# Patient Record
Sex: MALE | Race: ASIAN | NOT HISPANIC OR LATINO | URBAN - METROPOLITAN AREA
[De-identification: names, ages, dates, MRNs, and addresses within clinical notes are randomized per-mention and may not be internally consistent; named-entity substitution may affect disease eponyms.]

---

## 2017-04-02 ENCOUNTER — EMERGENCY (EMERGENCY)
Facility: HOSPITAL | Age: 67
LOS: 1 days | Discharge: HOME | End: 2017-04-02
Attending: EMERGENCY MEDICINE | Admitting: EMERGENCY MEDICINE
Payer: MEDICARE

## 2017-04-02 VITALS
OXYGEN SATURATION: 97 % | HEART RATE: 60 BPM | SYSTOLIC BLOOD PRESSURE: 155 MMHG | DIASTOLIC BLOOD PRESSURE: 90 MMHG | TEMPERATURE: 98 F | HEIGHT: 67 IN | RESPIRATION RATE: 14 BRPM | WEIGHT: 160.06 LBS

## 2017-04-02 VITALS
HEART RATE: 67 BPM | RESPIRATION RATE: 14 BRPM | DIASTOLIC BLOOD PRESSURE: 64 MMHG | TEMPERATURE: 98 F | SYSTOLIC BLOOD PRESSURE: 132 MMHG | OXYGEN SATURATION: 97 %

## 2017-04-02 DIAGNOSIS — J18.9 PNEUMONIA, UNSPECIFIED ORGANISM: ICD-10-CM

## 2017-04-02 DIAGNOSIS — Z88.0 ALLERGY STATUS TO PENICILLIN: ICD-10-CM

## 2017-04-02 DIAGNOSIS — I10 ESSENTIAL (PRIMARY) HYPERTENSION: ICD-10-CM

## 2017-04-02 DIAGNOSIS — R07.89 OTHER CHEST PAIN: ICD-10-CM

## 2017-04-02 LAB
ALBUMIN SERPL ELPH-MCNC: 3.9 G/DL — SIGNIFICANT CHANGE UP (ref 3.3–5)
ALP SERPL-CCNC: 86 U/L — SIGNIFICANT CHANGE UP (ref 40–120)
ALT FLD-CCNC: 34 U/L — SIGNIFICANT CHANGE UP (ref 12–78)
ANION GAP SERPL CALC-SCNC: 9 MMOL/L — SIGNIFICANT CHANGE UP (ref 5–17)
APTT BLD: 38.6 SEC — HIGH (ref 27.5–37.4)
AST SERPL-CCNC: 16 U/L — SIGNIFICANT CHANGE UP (ref 15–37)
BASOPHILS # BLD AUTO: 0 K/UL — SIGNIFICANT CHANGE UP (ref 0–0.2)
BASOPHILS NFR BLD AUTO: 0.5 % — SIGNIFICANT CHANGE UP (ref 0–2)
BILIRUB SERPL-MCNC: 0.3 MG/DL — SIGNIFICANT CHANGE UP (ref 0.2–1.2)
BUN SERPL-MCNC: 19 MG/DL — SIGNIFICANT CHANGE UP (ref 7–23)
CALCIUM SERPL-MCNC: 8.7 MG/DL — SIGNIFICANT CHANGE UP (ref 8.5–10.1)
CHLORIDE SERPL-SCNC: 106 MMOL/L — SIGNIFICANT CHANGE UP (ref 96–108)
CK MB BLD-MCNC: 0.7 % — SIGNIFICANT CHANGE UP (ref 0–3.5)
CK MB BLD-MCNC: <0.8 % — SIGNIFICANT CHANGE UP (ref 0–3.5)
CK MB CFR SERPL CALC: 0.5 NG/ML — SIGNIFICANT CHANGE UP (ref 0–3.6)
CK MB CFR SERPL CALC: <0.5 NG/ML — SIGNIFICANT CHANGE UP (ref 0–3.6)
CK SERPL-CCNC: 65 U/L — SIGNIFICANT CHANGE UP (ref 26–308)
CK SERPL-CCNC: 70 U/L — SIGNIFICANT CHANGE UP (ref 26–308)
CO2 SERPL-SCNC: 26 MMOL/L — SIGNIFICANT CHANGE UP (ref 22–31)
CREAT SERPL-MCNC: 0.78 MG/DL — SIGNIFICANT CHANGE UP (ref 0.5–1.3)
EOSINOPHIL # BLD AUTO: 0.1 K/UL — SIGNIFICANT CHANGE UP (ref 0–0.5)
EOSINOPHIL NFR BLD AUTO: 1 % — SIGNIFICANT CHANGE UP (ref 0–6)
GLUCOSE SERPL-MCNC: 81 MG/DL — SIGNIFICANT CHANGE UP (ref 70–99)
HCT VFR BLD CALC: 46.5 % — SIGNIFICANT CHANGE UP (ref 39–50)
HGB BLD-MCNC: 15.8 G/DL — SIGNIFICANT CHANGE UP (ref 13–17)
INR BLD: 0.99 RATIO — SIGNIFICANT CHANGE UP (ref 0.88–1.16)
LYMPHOCYTES # BLD AUTO: 2.1 K/UL — SIGNIFICANT CHANGE UP (ref 1–3.3)
LYMPHOCYTES # BLD AUTO: 33.1 % — SIGNIFICANT CHANGE UP (ref 13–44)
MCHC RBC-ENTMCNC: 29.8 PG — SIGNIFICANT CHANGE UP (ref 27–34)
MCHC RBC-ENTMCNC: 34 GM/DL — SIGNIFICANT CHANGE UP (ref 32–36)
MCV RBC AUTO: 87.7 FL — SIGNIFICANT CHANGE UP (ref 80–100)
MONOCYTES # BLD AUTO: 0.4 K/UL — SIGNIFICANT CHANGE UP (ref 0–0.9)
MONOCYTES NFR BLD AUTO: 6.9 % — SIGNIFICANT CHANGE UP (ref 1–9)
NEUTROPHILS # BLD AUTO: 3.7 K/UL — SIGNIFICANT CHANGE UP (ref 1.8–7.4)
NEUTROPHILS NFR BLD AUTO: 58.5 % — SIGNIFICANT CHANGE UP (ref 43–77)
PLATELET # BLD AUTO: 315 K/UL — SIGNIFICANT CHANGE UP (ref 150–400)
POTASSIUM SERPL-MCNC: 4 MMOL/L — SIGNIFICANT CHANGE UP (ref 3.5–5.3)
POTASSIUM SERPL-SCNC: 4 MMOL/L — SIGNIFICANT CHANGE UP (ref 3.5–5.3)
PROT SERPL-MCNC: 8.3 G/DL — SIGNIFICANT CHANGE UP (ref 6–8.3)
PROTHROM AB SERPL-ACNC: 10.8 SEC — SIGNIFICANT CHANGE UP (ref 9.8–12.7)
RBC # BLD: 5.3 M/UL — SIGNIFICANT CHANGE UP (ref 4.2–5.8)
RBC # FLD: 12.5 % — SIGNIFICANT CHANGE UP (ref 10.3–14.5)
SODIUM SERPL-SCNC: 141 MMOL/L — SIGNIFICANT CHANGE UP (ref 135–145)
TROPONIN I SERPL-MCNC: <.015 NG/ML — SIGNIFICANT CHANGE UP (ref 0.01–0.04)
TROPONIN I SERPL-MCNC: <.015 NG/ML — SIGNIFICANT CHANGE UP (ref 0.01–0.04)
WBC # BLD: 6.4 K/UL — SIGNIFICANT CHANGE UP (ref 3.8–10.5)
WBC # FLD AUTO: 6.4 K/UL — SIGNIFICANT CHANGE UP (ref 3.8–10.5)

## 2017-04-02 PROCEDURE — 36000 PLACE NEEDLE IN VEIN: CPT

## 2017-04-02 PROCEDURE — 71010: CPT | Mod: 26

## 2017-04-02 PROCEDURE — 85730 THROMBOPLASTIN TIME PARTIAL: CPT

## 2017-04-02 PROCEDURE — 93005 ELECTROCARDIOGRAM TRACING: CPT

## 2017-04-02 PROCEDURE — 82550 ASSAY OF CK (CPK): CPT

## 2017-04-02 PROCEDURE — 84484 ASSAY OF TROPONIN QUANT: CPT

## 2017-04-02 PROCEDURE — 82553 CREATINE MB FRACTION: CPT

## 2017-04-02 PROCEDURE — 85027 COMPLETE CBC AUTOMATED: CPT

## 2017-04-02 PROCEDURE — 99285 EMERGENCY DEPT VISIT HI MDM: CPT

## 2017-04-02 PROCEDURE — 85610 PROTHROMBIN TIME: CPT

## 2017-04-02 PROCEDURE — 80053 COMPREHEN METABOLIC PANEL: CPT

## 2017-04-02 PROCEDURE — 71045 X-RAY EXAM CHEST 1 VIEW: CPT

## 2017-04-02 PROCEDURE — 99284 EMERGENCY DEPT VISIT MOD MDM: CPT

## 2017-04-02 RX ORDER — SODIUM CHLORIDE 9 MG/ML
3 INJECTION INTRAMUSCULAR; INTRAVENOUS; SUBCUTANEOUS ONCE
Qty: 0 | Refills: 0 | Status: COMPLETED | OUTPATIENT
Start: 2017-04-02 | End: 2017-04-02

## 2017-04-02 RX ORDER — CIPROFLOXACIN LACTATE 400MG/40ML
1 VIAL (ML) INTRAVENOUS
Qty: 7 | Refills: 0
Start: 2017-04-02 | End: 2017-04-09

## 2017-04-02 RX ADMIN — SODIUM CHLORIDE 3 MILLILITER(S): 9 INJECTION INTRAMUSCULAR; INTRAVENOUS; SUBCUTANEOUS at 19:09

## 2017-04-02 NOTE — ED ADULT NURSE NOTE - OBJECTIVE STATEMENT
pt to er by ambulance states he had chest pain earlier today upon arrival is awake and alert speech clear lungs clear resp even unlabored pain free at present on cardiac monitor asa and nitro 20 angio right ac pta

## 2017-04-02 NOTE — CONSULT NOTE ADULT - ASSESSMENT
The patient is a 66-year-old man with a history of hypertension and a history of palpitations, presenting with an episode of weakness, palpitations and atypical chest discomfort.    The patient is presenting with symptoms that he has had in some fashion for at least 5 or 6 years in the case of the chest discomfort, and for decades in the case of the palpitations.  It certainly is possible that he has some form of arrhythmia. I would not be surprised if he has an actual diagnosis of SVT, that is being treated fairly successfully with beta blockers. He had a stress test 5 or 6 years ago for the chest discomfort.    At this time, I would recommend repetition of his cardiac enzymes. If those are negative, this would suggest that he is in a sufficiently low risk category so as to be able to complete outpatient evaluation, as opposed to an inpatient evaluation. I would suggest outpatient stress testing. He might also benefit from an outpatient event monitor. If his enzymes are abnormal, he can be considered for cardiac catheterization.    Thank you for this referral and we will continue to follow him while he remains hospitalized

## 2017-04-02 NOTE — ED PROVIDER NOTE - OBJECTIVE STATEMENT
pt bib ems for left sided chest pain while shopping. pt given sl NTG and asa with relief of pain. no fevers, chills, ha, sob, cough, abd pain, edema, calf pain, travel.  pmd [place] [facility] [place]

## 2017-04-02 NOTE — ED PROVIDER NOTE - CARE PLAN
Principal Discharge DX:	Chest pain, unspecified type Principal Discharge DX:	Chest pain, unspecified type  Secondary Diagnosis:	Pneumonia

## 2017-04-02 NOTE — CONSULT NOTE ADULT - SUBJECTIVE AND OBJECTIVE BOX
[facility]         Jessica Ritchie, Brianda De La Garza, [place]        [phone] (office)    CHIEF COMPLAINT: Patient is a 66y old  Male who presents with a chief complaint of cp and palpitations    HPI: The patient is a 66-year-old man with a history of hypertension. He has a history of a "irregular heartbeat", for which he has been on  metoprolol for the last 5 years. He has a history of recurrent episodes of syncope associated with a sensation of a fast heartbeat, which has been occurring since he was a child. He has history of atypical chest discomfort which goes back at least 5 or 6 years, for which he has had a stress test in the past.    He presents to the hospital today because of palpitations and chest discomfort. He reports that over the past many decades, he will feel a sudden sense of "loss of energy", which occurred suddenly. This would generally be followed by a sense of a "jumping" heartbeat, and a feeling of presyncope or even syncope.  Today, he walked up a flight of stairs, and after that, felt the sudden onset of weakness, and a sense that he "might collapse ". He reported that his vision was not very sharp, and he was mildly sweaty. He's been given nitroglycerin for unclear reasons by a doctor in [place], and he decided to open it and put a nitroglycerin tablet under his tongue. He then developed a headache. Sometime after this, he developed 5 or 10 minutes of a "mild" chest discomfort which is left-sided underneath his left breast, which he has had intermittently over the last 5 or 6 years. This resolved spontaneously. He became concerned, and therefore came to the emergency department for further evaluation.    [facility] he's been found to have a normal EKG, and a single set of cardiac enzymes was negative. Cardiology consultation is being obtained to make further recommendations regarding his ongoing evaluation and management.    He presently feels well.      PAST MEDICAL & SURGICAL HISTORY:  HTN (hypertension)  Cholecystectomy    SOCIAL HISTORY: No active tobacco, alcohol or illicit drug use    FAMILY HISTORY: His father had some sort of lung issues.    Outpatient medications include metoprolol, 50 mg twice daily  MEDICATIONS  (STANDING):  sodium chloride 0.9% lock flush 3milliLiter(s) IV Push once    MEDICATIONS  (PRN):      Allergies    penicillin (Unknown)    Intolerances        REVIEW OF SYSTEMS: Is negative for eye, ENT, GI, , allergic, dermatologic, musculoskeletal and neurologic, except as described above.    VITAL SIGNS:   Vital Signs Last 24 Hrs  T(C): 36.4, Max: 36.4 (04-02 @ 12:28)  T(F): 97.6, Max: 97.6 (04-02 @ 12:28)  HR: 60 (60 - 60)  BP: 155/90 (155/90 - 155/90)  BP(mean): --  RR: 14 (14 - 14)  SpO2: 97% (97% - 97%)    I&O's Summary      PHYSICAL EXAM:    Constitutional: NAD, awake and alert, well-developed  Eyes:  EOMI,  Pupils round, No oral cyanosis, conjunctivae clear, anicteric.  Pulmonary: Non-labored, breath sounds are clear bilaterally, No wheezing, rales or rhonchi  Cardiovascular: PMI not palpable non-displaced, regular S1 and S2, no murmurs, rubs, gallops or clicks  Gastrointestinal: Bowel Sounds present, soft, nontender.   Lymph: No peripheral edema. No cervical lymphadenopathy.  Neurological: Alert, strength and sensitivity are grossly intact  Skin: No obvious lesions/rashes.   Psych:  Mood & affect appropriate .    LABS: All Labs Reviewed:                        15.8   6.4   )-----------( 315      ( 02 Apr 2017 13:23 )             46.5     02 Apr 2017 13:23    141    |  106    |  19     ----------------------------<  81     4.0     |  26     |  0.78     Ca    8.7        02 Apr 2017 13:23    TPro  8.3    /  Alb  3.9    /  TBili  0.3    /  DBili  x      /  AST  16     /  ALT  34     /  AlkPhos  86     02 Apr 2017 13:23    PT/INR - ( 02 Apr 2017 13:23 )   PT: 10.8 sec;   INR: 0.99 ratio         PTT - ( 02 Apr 2017 13:23 )  PTT:38.6 sec  CARDIAC MARKERS ( 02 Apr 2017 13:23 )  <.015 ng/mL / x     / 65 U/L / x     / <0.5 ng/mL      Blood Culture:         RADIOLOGY:    EKG:Sinus rhythm, normal

## 2017-04-03 RX ORDER — CIPROFLOXACIN LACTATE 400MG/40ML
1 VIAL (ML) INTRAVENOUS
Qty: 7 | Refills: 0
Start: 2017-04-03 | End: 2017-04-10

## 2018-01-09 ENCOUNTER — EMERGENCY (EMERGENCY)
Facility: HOSPITAL | Age: 68
LOS: 1 days | Discharge: HOME | End: 2018-01-09
Attending: EMERGENCY MEDICINE | Admitting: EMERGENCY MEDICINE
Payer: MEDICARE

## 2018-01-09 VITALS
HEART RATE: 81 BPM | TEMPERATURE: 98 F | DIASTOLIC BLOOD PRESSURE: 83 MMHG | SYSTOLIC BLOOD PRESSURE: 139 MMHG | RESPIRATION RATE: 19 BRPM | OXYGEN SATURATION: 96 %

## 2018-01-09 VITALS
RESPIRATION RATE: 18 BRPM | DIASTOLIC BLOOD PRESSURE: 97 MMHG | SYSTOLIC BLOOD PRESSURE: 155 MMHG | OXYGEN SATURATION: 95 % | TEMPERATURE: 98 F | HEART RATE: 73 BPM

## 2018-01-09 LAB
ALBUMIN SERPL ELPH-MCNC: 4.1 G/DL — SIGNIFICANT CHANGE UP (ref 3.3–5)
ALP SERPL-CCNC: 71 U/L — SIGNIFICANT CHANGE UP (ref 40–120)
ALT FLD-CCNC: 33 U/L — SIGNIFICANT CHANGE UP (ref 4–41)
AST SERPL-CCNC: 25 U/L — SIGNIFICANT CHANGE UP (ref 4–40)
BASOPHILS # BLD AUTO: 0.02 K/UL — SIGNIFICANT CHANGE UP (ref 0–0.2)
BASOPHILS NFR BLD AUTO: 0.3 % — SIGNIFICANT CHANGE UP (ref 0–2)
BILIRUB SERPL-MCNC: 0.3 MG/DL — SIGNIFICANT CHANGE UP (ref 0.2–1.2)
BUN SERPL-MCNC: 12 MG/DL — SIGNIFICANT CHANGE UP (ref 7–23)
CALCIUM SERPL-MCNC: 8.8 MG/DL — SIGNIFICANT CHANGE UP (ref 8.4–10.5)
CHLORIDE SERPL-SCNC: 104 MMOL/L — SIGNIFICANT CHANGE UP (ref 98–107)
CK MB BLD-MCNC: 1 NG/ML — SIGNIFICANT CHANGE UP (ref 1–6.6)
CK SERPL-CCNC: 58 U/L — SIGNIFICANT CHANGE UP (ref 30–200)
CO2 SERPL-SCNC: 21 MMOL/L — LOW (ref 22–31)
CREAT SERPL-MCNC: 0.71 MG/DL — SIGNIFICANT CHANGE UP (ref 0.5–1.3)
D DIMER BLD IA.RAPID-MCNC: < 150 NG/ML — SIGNIFICANT CHANGE UP
EOSINOPHIL # BLD AUTO: 0.05 K/UL — SIGNIFICANT CHANGE UP (ref 0–0.5)
EOSINOPHIL NFR BLD AUTO: 0.8 % — SIGNIFICANT CHANGE UP (ref 0–6)
GLUCOSE SERPL-MCNC: 101 MG/DL — HIGH (ref 70–99)
HCT VFR BLD CALC: 43.3 % — SIGNIFICANT CHANGE UP (ref 39–50)
HGB BLD-MCNC: 14.8 G/DL — SIGNIFICANT CHANGE UP (ref 13–17)
IMM GRANULOCYTES # BLD AUTO: 0.03 # — SIGNIFICANT CHANGE UP
IMM GRANULOCYTES NFR BLD AUTO: 0.5 % — SIGNIFICANT CHANGE UP (ref 0–1.5)
LYMPHOCYTES # BLD AUTO: 1.63 K/UL — SIGNIFICANT CHANGE UP (ref 1–3.3)
LYMPHOCYTES # BLD AUTO: 24.5 % — SIGNIFICANT CHANGE UP (ref 13–44)
MAGNESIUM SERPL-MCNC: 2.1 MG/DL — SIGNIFICANT CHANGE UP (ref 1.6–2.6)
MCHC RBC-ENTMCNC: 30.4 PG — SIGNIFICANT CHANGE UP (ref 27–34)
MCHC RBC-ENTMCNC: 34.2 % — SIGNIFICANT CHANGE UP (ref 32–36)
MCV RBC AUTO: 88.9 FL — SIGNIFICANT CHANGE UP (ref 80–100)
MONOCYTES # BLD AUTO: 0.34 K/UL — SIGNIFICANT CHANGE UP (ref 0–0.9)
MONOCYTES NFR BLD AUTO: 5.1 % — SIGNIFICANT CHANGE UP (ref 2–14)
NEUTROPHILS # BLD AUTO: 4.58 K/UL — SIGNIFICANT CHANGE UP (ref 1.8–7.4)
NEUTROPHILS NFR BLD AUTO: 68.8 % — SIGNIFICANT CHANGE UP (ref 43–77)
NRBC # FLD: 0 — SIGNIFICANT CHANGE UP
PHOSPHATE SERPL-MCNC: 2.6 MG/DL — SIGNIFICANT CHANGE UP (ref 2.5–4.5)
PLATELET # BLD AUTO: 278 K/UL — SIGNIFICANT CHANGE UP (ref 150–400)
PMV BLD: 9.6 FL — SIGNIFICANT CHANGE UP (ref 7–13)
POTASSIUM SERPL-MCNC: 4.3 MMOL/L — SIGNIFICANT CHANGE UP (ref 3.5–5.3)
POTASSIUM SERPL-SCNC: 4.3 MMOL/L — SIGNIFICANT CHANGE UP (ref 3.5–5.3)
PROT SERPL-MCNC: 7.5 G/DL — SIGNIFICANT CHANGE UP (ref 6–8.3)
RBC # BLD: 4.87 M/UL — SIGNIFICANT CHANGE UP (ref 4.2–5.8)
RBC # FLD: 13.2 % — SIGNIFICANT CHANGE UP (ref 10.3–14.5)
SODIUM SERPL-SCNC: 139 MMOL/L — SIGNIFICANT CHANGE UP (ref 135–145)
TROPONIN T SERPL-MCNC: < 0.06 NG/ML — SIGNIFICANT CHANGE UP (ref 0–0.06)
TROPONIN T SERPL-MCNC: < 0.06 NG/ML — SIGNIFICANT CHANGE UP (ref 0–0.06)
WBC # BLD: 6.65 K/UL — SIGNIFICANT CHANGE UP (ref 3.8–10.5)
WBC # FLD AUTO: 6.65 K/UL — SIGNIFICANT CHANGE UP (ref 3.8–10.5)

## 2018-01-09 PROCEDURE — 99285 EMERGENCY DEPT VISIT HI MDM: CPT | Mod: 25

## 2018-01-09 PROCEDURE — 71046 X-RAY EXAM CHEST 2 VIEWS: CPT | Mod: 26

## 2018-01-09 NOTE — ED ADULT TRIAGE NOTE - CHIEF COMPLAINT QUOTE
pt. c/o palpitations w/ diaphoresis that started at approximately 0200am, took a dose of "chinese medication", states it is similar to nitro, palpations have since subsided, not associated w/ SOB.  Pt. reports having a normal stress test about 1 week ago.  PMHx HTN.

## 2018-01-09 NOTE — ED PROVIDER NOTE - ATTENDING CONTRIBUTION TO CARE
68 yo male with a history of HTN presenting to the ED with an episode of palpitations and diaphoresis associated with mild left sided chest pressure that started around 0200. Pain feels like a few books sitting on his chest.  Has gotten better since arriving in the ED.  There is no radiation of the pain.  Has had a number of episodes similar to this over the last few months.  His cardiologist has been working this up since it started.  Last week he had an ECHO and a stress that were both normal.  Also had a sleep study two nights ago to see if it was related to sleep apnea.  No SOB.    Gen: Well appearing in NAD  Head: NC/AT  Neck: trachea midline  Resp:  No distress CTAB  CV: RRR   Abd: soft NT ND  Ext: no deformities no edema  Neuro:  A&O appears non focal  Skin:  Warm and dry as visualized    Pt with palps and chest pressure.  Has had episodes before.  Just had provocative testing and ECHO which are negative.  Due to age and HTN will get two sets of CE and EKG.  There is are no concerning findings on the EKG.  I was able to discuss the patient's care with Dr Amaya of cardiology.  He is very aware of the patient and agrees this is a typical episode for the patient.  He requested a d-dimer be sent.  If negative he would provide OP follow up along with referral to an EP specialist.  Will get the second trop as well.  Shared decision making with the patient about the plan and follow up necessity.

## 2018-01-09 NOTE — ED PROVIDER NOTE - CARE PLAN
Principal Discharge DX:	Palpitations  Instructions for follow-up, activity and diet:	Please follow up with your cardiologist in regards to your symptoms in the next week.  Drink at least 2 Liters or 64 Ounces of water each day.  Return for any persistent, worsening symptoms, or ANY concerns at all.

## 2018-01-09 NOTE — ED PROVIDER NOTE - PLAN OF CARE
Please follow up with your cardiologist in regards to your symptoms in the next week.  Drink at least 2 Liters or 64 Ounces of water each day.  Return for any persistent, worsening symptoms, or ANY concerns at all.

## 2018-01-09 NOTE — ED ADULT NURSE NOTE - OBJECTIVE STATEMENT
pt rc'd to room 16 A&Ox3 c/o palpitations with sweating that woke him from sleep at 2am lasting 15 minutes. Pt took chinese medicine for palpitations which caused the symptoms to resolve. Pt denies CP/dizziness/sweating/SOB/palpitations at present, appears NSR on monitor. Pt hx of HTN & cardiac stress test last week, EKG complete, IV 20G placed R AC vein, labs sent.

## 2018-01-09 NOTE — ED PROVIDER NOTE - OBJECTIVE STATEMENT
68yo M w/ pmhx of HTN c/o palpitations w/ diaphoresis that started around 2am. Denies any nausea, vomiting, diarrhea, constipation. Pt took a "chinese medication", which is like the equivalent of nitro, which brought some relief. Currently, pt reports mild left side chest tightness, which feels like "two books sitting on my chest". Pt has a stress test about 1 week ago, which was unremarkable. Denies any other symptoms.

## 2019-04-19 ENCOUNTER — EMERGENCY (EMERGENCY)
Facility: HOSPITAL | Age: 69
LOS: 1 days | Discharge: HOME | End: 2019-04-19
Attending: EMERGENCY MEDICINE
Payer: MEDICARE

## 2019-04-19 VITALS
SYSTOLIC BLOOD PRESSURE: 133 MMHG | RESPIRATION RATE: 20 BRPM | TEMPERATURE: 98 F | DIASTOLIC BLOOD PRESSURE: 76 MMHG | OXYGEN SATURATION: 95 % | HEART RATE: 81 BPM

## 2019-04-19 VITALS
OXYGEN SATURATION: 96 % | RESPIRATION RATE: 18 BRPM | TEMPERATURE: 98 F | HEART RATE: 81 BPM | DIASTOLIC BLOOD PRESSURE: 90 MMHG | SYSTOLIC BLOOD PRESSURE: 152 MMHG

## 2019-04-19 PROBLEM — I10 ESSENTIAL (PRIMARY) HYPERTENSION: Chronic | Status: ACTIVE | Noted: 2017-04-02

## 2019-04-19 LAB
ALBUMIN SERPL ELPH-MCNC: 4.2 G/DL — SIGNIFICANT CHANGE UP (ref 3.3–5)
ALP SERPL-CCNC: 78 U/L — SIGNIFICANT CHANGE UP (ref 40–120)
ALT FLD-CCNC: 16 U/L — SIGNIFICANT CHANGE UP (ref 10–45)
ANION GAP SERPL CALC-SCNC: 12 MMOL/L — SIGNIFICANT CHANGE UP (ref 5–17)
APTT BLD: 37.4 SEC — HIGH (ref 27.5–36.3)
AST SERPL-CCNC: 16 U/L — SIGNIFICANT CHANGE UP (ref 10–40)
BILIRUB SERPL-MCNC: 0.2 MG/DL — SIGNIFICANT CHANGE UP (ref 0.2–1.2)
BUN SERPL-MCNC: 12 MG/DL — SIGNIFICANT CHANGE UP (ref 7–23)
CALCIUM SERPL-MCNC: 9.1 MG/DL — SIGNIFICANT CHANGE UP (ref 8.4–10.5)
CHLORIDE SERPL-SCNC: 103 MMOL/L — SIGNIFICANT CHANGE UP (ref 96–108)
CO2 SERPL-SCNC: 24 MMOL/L — SIGNIFICANT CHANGE UP (ref 22–31)
CREAT SERPL-MCNC: 0.77 MG/DL — SIGNIFICANT CHANGE UP (ref 0.5–1.3)
GLUCOSE SERPL-MCNC: 97 MG/DL — SIGNIFICANT CHANGE UP (ref 70–99)
HCT VFR BLD CALC: 43.9 % — SIGNIFICANT CHANGE UP (ref 39–50)
HGB BLD-MCNC: 14.7 G/DL — SIGNIFICANT CHANGE UP (ref 13–17)
INR BLD: 1 RATIO — SIGNIFICANT CHANGE UP (ref 0.88–1.16)
MCHC RBC-ENTMCNC: 31 PG — SIGNIFICANT CHANGE UP (ref 27–34)
MCHC RBC-ENTMCNC: 33.5 GM/DL — SIGNIFICANT CHANGE UP (ref 32–36)
MCV RBC AUTO: 92.6 FL — SIGNIFICANT CHANGE UP (ref 80–100)
PLATELET # BLD AUTO: 333 K/UL — SIGNIFICANT CHANGE UP (ref 150–400)
POTASSIUM SERPL-MCNC: 3.5 MMOL/L — SIGNIFICANT CHANGE UP (ref 3.5–5.3)
POTASSIUM SERPL-SCNC: 3.5 MMOL/L — SIGNIFICANT CHANGE UP (ref 3.5–5.3)
PROT SERPL-MCNC: 7.4 G/DL — SIGNIFICANT CHANGE UP (ref 6–8.3)
PROTHROM AB SERPL-ACNC: 11.5 SEC — SIGNIFICANT CHANGE UP (ref 10–12.9)
RBC # BLD: 4.74 M/UL — SIGNIFICANT CHANGE UP (ref 4.2–5.8)
RBC # FLD: 12.3 % — SIGNIFICANT CHANGE UP (ref 10.3–14.5)
SODIUM SERPL-SCNC: 139 MMOL/L — SIGNIFICANT CHANGE UP (ref 135–145)
TROPONIN T, HIGH SENSITIVITY RESULT: <6 NG/L — SIGNIFICANT CHANGE UP (ref 0–51)
WBC # BLD: 8.7 K/UL — SIGNIFICANT CHANGE UP (ref 3.8–10.5)
WBC # FLD AUTO: 8.7 K/UL — SIGNIFICANT CHANGE UP (ref 3.8–10.5)

## 2019-04-19 PROCEDURE — 93010 ELECTROCARDIOGRAM REPORT: CPT

## 2019-04-19 PROCEDURE — 93005 ELECTROCARDIOGRAM TRACING: CPT

## 2019-04-19 PROCEDURE — 85027 COMPLETE CBC AUTOMATED: CPT

## 2019-04-19 PROCEDURE — 94640 AIRWAY INHALATION TREATMENT: CPT

## 2019-04-19 PROCEDURE — 80053 COMPREHEN METABOLIC PANEL: CPT

## 2019-04-19 PROCEDURE — 85610 PROTHROMBIN TIME: CPT

## 2019-04-19 PROCEDURE — 99285 EMERGENCY DEPT VISIT HI MDM: CPT | Mod: 25

## 2019-04-19 PROCEDURE — 85730 THROMBOPLASTIN TIME PARTIAL: CPT

## 2019-04-19 PROCEDURE — 99284 EMERGENCY DEPT VISIT MOD MDM: CPT | Mod: 25

## 2019-04-19 PROCEDURE — 71046 X-RAY EXAM CHEST 2 VIEWS: CPT

## 2019-04-19 PROCEDURE — 71046 X-RAY EXAM CHEST 2 VIEWS: CPT | Mod: 26

## 2019-04-19 PROCEDURE — 84484 ASSAY OF TROPONIN QUANT: CPT

## 2019-04-19 RX ORDER — AZITHROMYCIN 500 MG/1
500 TABLET, FILM COATED ORAL ONCE
Qty: 0 | Refills: 0 | Status: COMPLETED | OUTPATIENT
Start: 2019-04-19 | End: 2019-04-19

## 2019-04-19 RX ORDER — AZITHROMYCIN 500 MG/1
1 TABLET, FILM COATED ORAL
Qty: 4 | Refills: 0
Start: 2019-04-19 | End: 2019-04-22

## 2019-04-19 RX ORDER — ACETAMINOPHEN 500 MG
975 TABLET ORAL ONCE
Qty: 0 | Refills: 0 | Status: COMPLETED | OUTPATIENT
Start: 2019-04-19 | End: 2019-04-19

## 2019-04-19 RX ORDER — ALBUTEROL 90 UG/1
2.5 AEROSOL, METERED ORAL ONCE
Qty: 0 | Refills: 0 | Status: COMPLETED | OUTPATIENT
Start: 2019-04-19 | End: 2019-04-19

## 2019-04-19 RX ADMIN — Medication 100 MILLIGRAM(S): at 03:04

## 2019-04-19 RX ADMIN — ALBUTEROL 2.5 MILLIGRAM(S): 90 AEROSOL, METERED ORAL at 03:42

## 2019-04-19 RX ADMIN — Medication 975 MILLIGRAM(S): at 03:04

## 2019-04-19 RX ADMIN — AZITHROMYCIN 500 MILLIGRAM(S): 500 TABLET, FILM COATED ORAL at 04:23

## 2019-04-19 NOTE — ED PROVIDER NOTE - CLINICAL SUMMARY MEDICAL DECISION MAKING FREE TEXT BOX
PGY1/MD Ryan. 69 yo M with HTN, arrhythmia, recently started on Steroid (2 mo), p/w 9 day cough, chest pain, since last night. Chest pain souds like, muscle skeletal, DDx: ACS, pericarditis, myocarditis, pneumothorax, pneumonia, will send EKG, trop, lab. no e/o Zoster. Historically not abrupt in onset, tearing or ripping, pulses symmetric, no tachycardia, no e/o aortic dissection or PE. PGY1/MD Ryan. 69 yo M with HTN, arrhythmia, recently started on Steroid (2 mo), p/w 9 day cough, chest pain, since last night. Chest pain sounds like, muscle skeletal, DDx: ACS, pericarditis, myocarditis, pneumothorax, pneumonia, will send EKG, trop, lab. no e/o Zoster. Historically not abrupt in onset, tearing or ripping, pulses symmetric, no tachycardia, no e/o aortic dissection or PE.

## 2019-04-19 NOTE — ED PROVIDER NOTE - OBJECTIVE STATEMENT
PGY1/MD Ryan. 67 yo M with HTN, arrhythmia, TA?? on steroids?, p/w cough x 9 days, chest pain last nigh. Left chest pain, squeeze, traveling to left shoulder, no nauseous, or diaphoresis. PGY1/MD Ryan. 67 yo M with HTN, arrhythmia, TA?? on steroids?, p/w cough x 9 days, chest pain last nigh. Left chest pain, squeeze, traveling to left shoulder, no associated symptoms like nauseous or diaphoresis. Dry cough, with no improvement, made him anxious. Started headache, left side, pounding/pressure feeling, since last night. No fever. No f/o CAD.

## 2019-04-19 NOTE — ED PROVIDER NOTE - ATTENDING CONTRIBUTION TO CARE
HR WNL, no symptomatic hypotension or actionable hypertension, afebrile  NAD, Lungs CTA, Heart RRR, no LE edema  EKG non-ischemic  CXR clear  Check troponin and electrolytes  Heart Score: 3  Wells’ Score: 0 HR WNL, no symptomatic hypotension or actionable hypertension, afebrile. Audible dry cough during exam.  NAD, Lungs CTA, Heart RRR, no LE edema  EKG non-ischemic  CXR clear  Check troponin and electrolytes  Heart Score: 3  Wells’ Score: 0    CP mainly with coughing, otherwise non-exertional: f/u PCP for further CAD screening  Coughing for 9 days: CXR clear, will tx empirically for occult PNA. Stable for outpt tx w/ PO abx. HR WNL, no symptomatic hypotension or actionable hypertension, afebrile. Audible dry cough during exam.  NAD, Lungs CTA, Heart RRR, no LE edema  EKG non-ischemic  CXR clear  Check troponin and electrolytes  Heart Score: 3  Wells’ Score: 0    CP mainly with coughing, otherwise non-exertional: f/u PCP for further CAD screening  Coughing for 9 days: PNA vs bronchitis: CXR clear, will tx empirically for occult PNA. Stable for outpt tx w/ PO abx.

## 2019-04-19 NOTE — ED PROVIDER NOTE - PHYSICAL EXAMINATION
PGY1/MD Ryan.   VITALS: reviewed  GEN: No apparent distress, A & O x 4  HEAD/EYES: NC/AT, PERRL, EOMI, anicteric sclerae, no conjunctival pallor  ENT: mucus membranes moist, oropharynx WNL, trachea midline, no JVD, neck is supple  RESP: lungs CTA with equal breath sounds bilaterally, chest wall nontender and atraumatic  CV: heart with reg rhythm S1, S2, no murmur; distal pulses intact and symmetric bilaterally  ABDOMEN: normoactive bowel sounds, soft, nondistended, nontender,  MSK: extremities atraumatic and nontender, no edema, no asymmetry.   SKIN: warm, dry, no rash, no bruising, no cyanosis. color appropriate for ethnicity  NEURO: alert, mentating appropriately, no facial asymmetry. gross sensation, motor, coordination are intact  PSYCH: Affect appropriate

## 2019-04-19 NOTE — ED ADULT NURSE NOTE - OBJECTIVE STATEMENT
69 yo M arrived to the by ambulance c/o previous cp; pt was recently dx with the Flu virus; reports he was coughing then started having cp; pt took 2 doses of nitro, which resolved the cp; pt denies any cp/complaints on assessment; denies fevers/chills/nvd

## 2019-04-19 NOTE — ED PROVIDER NOTE - NSFOLLOWUPINSTRUCTIONS_ED_ALL_ED_FT
Thank you for visiting our Emergency Department, it has been a pleasure taking part in your healthcare.    You had a thorough evaluation including an exam, labs and imaging. You were given medications for comfort. Your workup did not demonstrate any concerning findings. This does not mean that your symptom is not real, only that we were unable to find a dangerous or life-threatening cause. Please read the attached information sheets as they will provide useful information regarding your condition.    Your discharge diagnosis is: pneumonia  Please take all discharge medications as indicated below: Z-Pack 250 mg once daily, x 4 days  Return precautions to the Emergency Department include but are not limited to: unrelenting nausea, vomiting, fever, chest pain, shortness of breath, chest or abdominal pain, worsening back pain, syncope, blood in urine or stool, headache that doesn't resolve, numbness or tingling, loss of sensation, loss of motor function, or any other concerning symptoms.    1) Follow up with your medical doctor in 2-3 days or call our clinic at [phone] and state you were seen in the Emergency Department and would like to be seen in clinic.  2) Take over the counter, Benzonate for severe cough  3) Take Tylenol 650-1000mg every six hours for headache  4) Drink at least 2 Liters or 64 Ounces of water each day.

## 2019-04-19 NOTE — ED ADULT NURSE NOTE - NSIMPLEMENTINTERV_GEN_ALL_ED
Implemented All Universal Safety Interventions:  [place] to call system. Call bell, personal items and telephone within reach. Instruct patient to call for assistance. Room bathroom lighting operational. Non-slip footwear when patient is off stretcher. Physically safe environment: no spills, clutter or unnecessary equipment. Stretcher in lowest position, wheels locked, appropriate side rails in place.

## 2019-06-17 ENCOUNTER — EMERGENCY (EMERGENCY)
Facility: HOSPITAL | Age: 69
LOS: 1 days | Discharge: HOME | End: 2019-06-17
Attending: EMERGENCY MEDICINE
Payer: MEDICARE

## 2019-06-17 VITALS
RESPIRATION RATE: 18 BRPM | SYSTOLIC BLOOD PRESSURE: 155 MMHG | TEMPERATURE: 98 F | DIASTOLIC BLOOD PRESSURE: 82 MMHG | HEART RATE: 61 BPM | OXYGEN SATURATION: 100 %

## 2019-06-17 VITALS
SYSTOLIC BLOOD PRESSURE: 152 MMHG | WEIGHT: 175.05 LBS | TEMPERATURE: 98 F | HEART RATE: 75 BPM | OXYGEN SATURATION: 100 % | RESPIRATION RATE: 18 BRPM | DIASTOLIC BLOOD PRESSURE: 77 MMHG | HEIGHT: 66 IN

## 2019-06-17 PROCEDURE — 99284 EMERGENCY DEPT VISIT MOD MDM: CPT

## 2019-06-17 PROCEDURE — 99283 EMERGENCY DEPT VISIT LOW MDM: CPT

## 2019-06-17 RX ORDER — DIPHENHYDRAMINE HCL 50 MG
50 CAPSULE ORAL ONCE
Refills: 0 | Status: COMPLETED | OUTPATIENT
Start: 2019-06-17 | End: 2019-06-17

## 2019-06-17 RX ORDER — FAMOTIDINE 10 MG/ML
20 INJECTION INTRAVENOUS DAILY
Refills: 0 | Status: DISCONTINUED | OUTPATIENT
Start: 2019-06-17 | End: 2019-06-21

## 2019-06-17 RX ADMIN — FAMOTIDINE 20 MILLIGRAM(S): 10 INJECTION INTRAVENOUS at 12:52

## 2019-06-17 RX ADMIN — Medication 50 MILLIGRAM(S): at 12:52

## 2019-06-17 NOTE — ED PROVIDER NOTE - CARE PLAN
Principal Discharge DX:	Allergic reaction Principal Discharge DX:	Allergic reaction  Secondary Diagnosis:	Contact dermatitis

## 2019-06-17 NOTE — ED PROVIDER NOTE - PHYSICAL EXAMINATION
*Gen: NAD, AAO*3  *HEENT: NC/AT, + swelling/redness over upper eye lid, mild redness of face, no oropharyngeal swelling, no dysphonia, tolerating secretions w/o difficulty, MMM, airway patent, trachea midline  *CV: RRR, S1/S2 present, no murmurs/rubs/gallops  *Resp: no respiratory distress, LCTAB, no wheezing/rales/rhonchi  *Abd: non-distended, soft N/Tx4, no guarding or rigidity  *Neuro: no focal neuro deficits, moving all limbs appropriately  *Extremities: no gross deformity  *Skin: no rashes, no wounds   ~ Luisana Masterson M.D.

## 2019-06-17 NOTE — ED PROVIDER NOTE - NSFOLLOWUPINSTRUCTIONS_ED_ALL_ED_FT
You were seen in the ER for an allergic reaction.  Please follow-up with your Primary Care Doctor and an Allergist for further evaluation.  If you are unable to see an Allergist through your Primary Care Doctor – you may contact our clinic at [phone].  You may also choose to call [phone] to discuss physicians in the area that may take your insurance.  Continue to take Benadryl 25mg every 8 hours for the next 48 hours – use caution as this may cause drowsiness (do NOT drive or operate heavy machinery).  A prescription for Pepcid 20mg has been sent to your pharmacy – use twice daily for the next 4 days.  A prescription for an EpiPen has been sent to your pharmacy – use ONLY with severe reactions, throat swelling, and/or difficulty breathing – and promptly return to the ER for further evaluation. You were seen in the ER for an allergic reaction.  Please follow-up with your Primary Care Doctor and an Allergist for further evaluation.  If you are unable to see an Allergist through your Primary Care Doctor – you may contact our clinic at [phone].  You may also choose to call [phone] to discuss physicians in the area that may take your insurance.  Take Benadryl 25mg every 8 hours for the next 48 hours – use caution as this may cause drowsiness (do NOT drive or operate heavy machinery).    Use Pepcid 20mg has been sent to your pharmacy – use twice daily for the next 4 days.  Can use over-the-counter Hydrocortisone cream for rash on the body; use only as directed.    Return to the ER if you have oral swelling, difficulty swallowing and/or worsening facial swelling.

## 2019-06-17 NOTE — ED PROVIDER NOTE - OBJECTIVE STATEMENT
68 year-old male with history of HTH, HLD presents to the Emergency Department for allergic reaction.  Patient mentions he was gardening on Saturday; then shortly afterward began to notice some swelling over his face and distal forearm; redness with itching.  No fevers, chills, nausea, vomiting, chest pain, shortness of breath, vision changes.  Did not take any medications at home.  No trouble swallowing, hoarse voice or difficulty eating/drinking.  No recent changes in medications.  Patient has had similar allergic reactions in the past from gardening/yard work.

## 2019-06-17 NOTE — ED PROVIDER NOTE - PROGRESS NOTE DETAILS
Zelalem MORALES: Patient reports symptoms relief; observed in the ER.  Clear for d/c and outpatient follow-up.

## 2019-06-17 NOTE — ED ADULT NURSE NOTE - OBJECTIVE STATEMENT
68 year old male A&OX4 presents with bilateral eye swelling and discomfort that started after gardening on saturday. Patient has had similar episodes in the past which was diagnosed as an immune response but is unsure of the name and is not currently on medication for swelling. Patient reports itching and some vision changes due to swelling. Lung sounds are clear and equal bilaterally. Denies nausea, vomiting, dizziness, weakness, fevers, chills, rash to other areas of the body.

## 2019-06-17 NOTE — ED PROVIDER NOTE - CLINICAL SUMMARY MEDICAL DECISION MAKING FREE TEXT BOX
68 year-old male with history of HTH, HLD presents to the Emergency Department for allergic reaction; possible poison ivy.  Plan to give benadryl, Pepcid and allergy follow-up outpatient. 68 year-old male with history of HTH, HLD presents to the Emergency Department for allergic reaction; possible poison ivy.  Plan to give benadryl, Pepcid and allergy follow-up outpatient.    Attending Statement: Agree with the above.  Contact dermatitis, eyelids most affected, no visual impairment, vss, no evidence of anaphylaxis, Wayne-Jeremiah syndrome, ten, etc.  Does not require IV medications or systemic steroids.  Needs otc topical steroids (hydrocortisone 1%), benadryl/pepcid, primary care physician/provider f/u.  --BMM

## 2021-08-10 ENCOUNTER — RX RENEWAL (OUTPATIENT)
Age: 71
End: 2021-08-10

## 2021-08-10 ENCOUNTER — APPOINTMENT (OUTPATIENT)
Dept: GASTROENTEROLOGY | Facility: CLINIC | Age: 71
End: 2021-08-10
Payer: MEDICARE

## 2021-08-10 VITALS
OXYGEN SATURATION: 95 % | BODY MASS INDEX: 27.78 KG/M2 | HEART RATE: 78 BPM | RESPIRATION RATE: 14 BRPM | WEIGHT: 177 LBS | TEMPERATURE: 98 F | DIASTOLIC BLOOD PRESSURE: 80 MMHG | HEIGHT: 67 IN | SYSTOLIC BLOOD PRESSURE: 125 MMHG

## 2021-08-10 PROCEDURE — 99204 OFFICE O/P NEW MOD 45 MIN: CPT

## 2021-08-10 RX ORDER — SODIUM SULFATE, POTASSIUM SULFATE, MAGNESIUM SULFATE 17.5; 3.13; 1.6 G/ML; G/ML; G/ML
17.5-3.13-1.6 SOLUTION, CONCENTRATE ORAL
Qty: 1 | Refills: 0 | Status: ACTIVE | COMMUNITY
Start: 2021-08-10 | End: 1900-01-01

## 2021-08-10 NOTE — ASSESSMENT
[FreeTextEntry1] : 70-year-old male reflux cannot rule out esophagitis\par Longstanding, frequent loose stools, likely related to his cholecystectomy cannot rule out colitis\par \par Plan\par Therapeutic trial Nexium daily\par Therapeutic trial cholestyramine, reordered\par Indications risks benefits and alternatives to endoscopy and colonoscopy reviewed\par Patient agreeable to examination\par Understands to obtain cardiology clearance prior to examination

## 2021-08-10 NOTE — HISTORY OF PRESENT ILLNESS
[de-identified] : 70-year-old male, longstanding acid reflux, now complains 3 to 4 days a week\par Denies dysphagia or odynophagia\par Denies any vomiting\par Denies nocturnal complaints\par Some improvement with Nexium 20 mg over-the-counter as needed\par Has not tried using on a day-to-day basis\par \par Patient also has chronic, frequent loose stools, particularly after meals 3-5 times daily\par Seen for the same issue 5 years ago, colonoscopy unremarkable\par History notable for cholecystectomy\par Therapeutic trial of cholestyramine ordered, patient cannot recall trying\par Patient denies any episodes of incontinence\par Denies any bleeding\par \par Patient denies history of coronary disease, negative stress testing, denies congestive heart failure\par Has had possible dysrhythmia, prior work-up including cardiac monitor reported as negative\par Has not seen his cardiologist for over a year\par \par Social history: , artist

## 2021-09-08 ENCOUNTER — RX RENEWAL (OUTPATIENT)
Age: 71
End: 2021-09-08

## 2021-10-13 ENCOUNTER — RX RENEWAL (OUTPATIENT)
Age: 71
End: 2021-10-13

## 2021-10-14 DIAGNOSIS — Z01.818 ENCOUNTER FOR OTHER PREPROCEDURAL EXAMINATION: ICD-10-CM

## 2021-10-15 ENCOUNTER — APPOINTMENT (OUTPATIENT)
Dept: DISASTER EMERGENCY | Facility: CLINIC | Age: 71
End: 2021-10-15

## 2021-10-16 LAB — SARS-COV-2 N GENE NPH QL NAA+PROBE: NOT DETECTED

## 2021-10-18 ENCOUNTER — OUTPATIENT (OUTPATIENT)
Dept: OUTPATIENT SERVICES | Facility: HOSPITAL | Age: 71
LOS: 1 days | End: 2021-10-18
Payer: MEDICARE

## 2021-10-18 ENCOUNTER — APPOINTMENT (OUTPATIENT)
Dept: GASTROENTEROLOGY | Facility: HOSPITAL | Age: 71
End: 2021-10-18

## 2021-10-18 ENCOUNTER — RESULT REVIEW (OUTPATIENT)
Age: 71
End: 2021-10-18

## 2021-10-18 VITALS
OXYGEN SATURATION: 99 % | SYSTOLIC BLOOD PRESSURE: 140 MMHG | RESPIRATION RATE: 20 BRPM | HEIGHT: 66 IN | HEART RATE: 76 BPM | DIASTOLIC BLOOD PRESSURE: 81 MMHG | TEMPERATURE: 97 F | WEIGHT: 177.91 LBS

## 2021-10-18 VITALS
DIASTOLIC BLOOD PRESSURE: 75 MMHG | SYSTOLIC BLOOD PRESSURE: 113 MMHG | RESPIRATION RATE: 15 BRPM | HEART RATE: 63 BPM | OXYGEN SATURATION: 98 %

## 2021-10-18 DIAGNOSIS — R12 HEARTBURN: ICD-10-CM

## 2021-10-18 PROCEDURE — 43239 EGD BIOPSY SINGLE/MULTIPLE: CPT

## 2021-10-18 PROCEDURE — 45380 COLONOSCOPY AND BIOPSY: CPT

## 2021-10-18 PROCEDURE — 88305 TISSUE EXAM BY PATHOLOGIST: CPT

## 2021-10-18 PROCEDURE — 88305 TISSUE EXAM BY PATHOLOGIST: CPT | Mod: 26

## 2021-10-18 RX ORDER — SODIUM CHLORIDE 9 MG/ML
500 INJECTION INTRAMUSCULAR; INTRAVENOUS; SUBCUTANEOUS
Refills: 0 | Status: DISCONTINUED | OUTPATIENT
Start: 2021-10-18 | End: 2021-11-01

## 2021-10-18 RX ORDER — METOPROLOL TARTRATE 50 MG
1 TABLET ORAL
Qty: 0 | Refills: 0 | DISCHARGE

## 2021-10-18 NOTE — PRE PROCEDURE NOTE - PRE PROCEDURE EVALUATION
Attending Physician:        Jonathan Gomez MD                    Procedure:    Indication for Procedure: reflux, screening (also diarrhea)  ________________________________________________________  PAST MEDICAL & SURGICAL HISTORY:  H/O: HTN (hypertension)    HTN (hypertension)    No significant past surgical history      ALLERGIES:  penicillin (Unknown)  penicillins (Unknown)  sulfa drugs (Unknown)    HOME MEDICATIONS:  metoprolol tartrate 50 mg oral tablet: 1 tab(s) orally 2 times a day    AICD/PPM: [ ] yes   [x ] no    PERTINENT LAB DATA:                      PHYSICAL EXAMINATION:    Height (cm): 167.6  Weight (kg): 80.7  BMI (kg/m2): 28.7  BSA (m2): 1.9T(C): 36.3  HR: --  BP: --  RR: 20  SpO2: --    Constitutional: NAD  HEENT: PERRLA, EOMI,    Neck:  No JVD  Respiratory: CTAB/L  Cardiovascular: S1 and S2  Gastrointestinal: BS+, soft, NT/ND  Extremities: No peripheral edema  Neurological: A/O x 3, no focal deficits  Psychiatric: Normal mood, normal affect  Skin: No rashes    ASA Class: I [ ]  II [ x]  III [ ]  IV [ ]    COMMENTS:    The patient is a suitable candidate for the planned procedure unless box checked [ ]  No, explain:

## 2021-10-19 ENCOUNTER — NON-APPOINTMENT (OUTPATIENT)
Age: 71
End: 2021-10-19

## 2021-10-19 LAB — SURGICAL PATHOLOGY STUDY: SIGNIFICANT CHANGE UP

## 2021-11-18 PROBLEM — Z00.00 ENCOUNTER FOR PREVENTIVE HEALTH EXAMINATION: Noted: 2021-11-18

## 2022-10-20 NOTE — ED PROVIDER NOTE - ATTESTATION, MLM
Cosentyx Pregnancy And Lactation Text: This medication is Pregnancy Category B and is considered safe during pregnancy. It is unknown if this medication is excreted in breast milk. I have reviewed and confirmed nurses' notes for patient's medications, allergies, medical history, and surgical history.

## 2023-08-07 ENCOUNTER — APPOINTMENT (OUTPATIENT)
Dept: OTOLARYNGOLOGY | Facility: CLINIC | Age: 73
End: 2023-08-07

## 2023-10-03 ENCOUNTER — APPOINTMENT (OUTPATIENT)
Dept: CT IMAGING | Facility: CLINIC | Age: 73
End: 2023-10-03
Payer: MEDICARE

## 2023-10-03 ENCOUNTER — OUTPATIENT (OUTPATIENT)
Dept: OUTPATIENT SERVICES | Facility: HOSPITAL | Age: 73
LOS: 1 days | End: 2023-10-03
Payer: MEDICARE

## 2023-10-03 DIAGNOSIS — Z00.8 ENCOUNTER FOR OTHER GENERAL EXAMINATION: ICD-10-CM

## 2023-10-03 PROCEDURE — 75574 CT ANGIO HRT W/3D IMAGE: CPT

## 2023-10-03 PROCEDURE — 75574 CT ANGIO HRT W/3D IMAGE: CPT | Mod: 26

## 2023-12-21 ENCOUNTER — APPOINTMENT (OUTPATIENT)
Dept: GASTROENTEROLOGY | Facility: CLINIC | Age: 73
End: 2023-12-21
Payer: MEDICARE

## 2023-12-21 VITALS
HEIGHT: 67 IN | OXYGEN SATURATION: 96 % | DIASTOLIC BLOOD PRESSURE: 78 MMHG | WEIGHT: 175 LBS | SYSTOLIC BLOOD PRESSURE: 123 MMHG | BODY MASS INDEX: 27.47 KG/M2 | HEART RATE: 62 BPM

## 2023-12-21 DIAGNOSIS — R12 HEARTBURN: ICD-10-CM

## 2023-12-21 DIAGNOSIS — R19.7 DIARRHEA, UNSPECIFIED: ICD-10-CM

## 2023-12-21 PROCEDURE — 99213 OFFICE O/P EST LOW 20 MIN: CPT

## 2023-12-21 NOTE — ASSESSMENT
[FreeTextEntry1] : Improving reflux on Nexium 40 mg daily Chronic diarrhea, perhaps nonspecific colitis, complaints spontaneously improved, no longer using cholestyramine  Plan Agree with recommendation for esomeprazole 40 mg daily Instructed patient on proper use, ideally 30 minutes before morning meal Encouraged to use daily, and once complaints completely controlled consider de-escalation to 20 mg daily dose May call me or his primary care physician to change the dose if desired No indication at this time for upper endoscopy Office follow-up as needed

## 2023-12-21 NOTE — HISTORY OF PRESENT ILLNESS
[FreeTextEntry1] : 73-year-old male Longstanding acid reflux Recent worsening of complaints, chest discomfort Seen by cardiology, stress test, other workup, normal Started on esomeprazole 40 mg daily by his primary physician Improvement of complaints of burning, chest discomfort, even some nocturnal symptoms Complaints overall similar to complaints a little over 2 years ago prior to his unremarkable upper endoscopy Patient denies any dysphagia Overall, some improvement of bowel habit History of chronic diarrhea, cholestyramine use Single biopsy from last colonoscopy showing nonspecific colitis, though examination grossly normal Currently patient states that he is no longer using cholestyramine, typically 2 bowel movements a day usually 1 formed in 1 soft  Social history: Artist

## 2024-04-15 NOTE — ED ADULT NURSE NOTE - NSFALLRSKASSESSTYPE_ED_ALL_ED
insulin lispro, 1 Unit Dial, (HUMALOG KWIKPEN) 100 UNIT/ML SOPN, Inject up to 12 units QAC TID, Disp: 6 Adjustable Dose Pre-filled Pen Syringe, Rfl: 1    aspirin 81 MG EC tablet, Take 1 tablet by mouth daily, Disp: , Rfl:     Insulin Pen Needle 31G X 5 MM MISC, 1 each by Does not apply route daily, Disp: , Rfl:     Allergies:   No Known Allergies    Social History:     Social History     Tobacco Use    Smoking status: Former     Current packs/day: 0.00     Average packs/day: 1 pack/day for 12.0 years (12.0 ttl pk-yrs)     Types: Cigarettes     Start date: 1970     Quit date: 1982     Years since quittin.2    Smokeless tobacco: Never   Vaping Use    Vaping Use: Never used   Substance Use Topics    Alcohol use: Yes     Comment: rare    Drug use: Not Currently       Physical Exam:     Vitals:    04/15/24 1021   BP: 134/64   Pulse: 94   Resp: 16   Temp: 97.7 °F (36.5 °C)   TempSrc: Temporal   SpO2: 96%   Weight: 85.3 kg (188 lb)   Height: 1.588 m (5' 2.5\")       Physical Exam (PE)    Physical Exam  Constitutional:       Appearance: Normal appearance.   HENT:      Head: Normocephalic.      Right Ear: Tympanic membrane, ear canal and external ear normal.      Left Ear: Tympanic membrane, ear canal and external ear normal.      Nose: Rhinorrhea present. No congestion.      Mouth/Throat:      Mouth: Mucous membranes are moist.      Pharynx: Oropharynx is clear. Posterior oropharyngeal erythema present. No oropharyngeal exudate.   Eyes:      Pupils: Pupils are equal, round, and reactive to light.   Cardiovascular:      Rate and Rhythm: Normal rate and regular rhythm.      Pulses: Normal pulses.      Heart sounds: Normal heart sounds.   Pulmonary:      Effort: Pulmonary effort is normal.      Breath sounds: Normal breath sounds. No wheezing, rhonchi or rales.   Abdominal:      General: Bowel sounds are normal.      Palpations: Abdomen is soft.   Musculoskeletal:         General: Normal range of motion.      
Initial (On Arrival)

## 2025-03-06 NOTE — ED ADULT TRIAGE NOTE - MODE OF ARRIVAL
Detail Level: Detailed Quality 110: Preventive Care And Screening: Influenza Immunization: Influenza Immunization Administered during Influenza season Quality 226: Preventive Care And Screening: Tobacco Use: Screening And Cessation Intervention: Patient screened for tobacco use and is an ex/non-smoker Quality 111:Pneumonia Vaccination Status For Older Adults: Pneumococcal Vaccination Previously Received Quality 130: Documentation Of Current Medications In The Medical Record: Current Medications Documented EMS

## 2025-04-04 NOTE — ED ADULT NURSE NOTE - CCCP TRG CHIEF CMPLNT
Check your blood pressure daily. Send in the log in about 2-3 weeks. Let me know if you are having palpitations, lightheadedness, chest pain, shortness of breath, or any other symptoms.    Do not adjust your diabetes medications at this time. Continue checking as you have been.    Germaine Strickland  [address] [place] [id number]  Phone: [phone]; Fax: [phone]     chest pain